# Patient Record
Sex: MALE | URBAN - METROPOLITAN AREA
[De-identification: names, ages, dates, MRNs, and addresses within clinical notes are randomized per-mention and may not be internally consistent; named-entity substitution may affect disease eponyms.]

---

## 2019-02-11 ENCOUNTER — Encounter (OUTPATIENT)
Dept: URBAN - METROPOLITAN AREA CLINIC 75 | Facility: CLINIC | Age: 69
End: 2019-02-11
Payer: COMMERCIAL

## 2019-02-11 PROCEDURE — 92015 DETERMINE REFRACTIVE STATE: CPT | Performed by: OPTOMETRIST

## 2019-02-11 PROCEDURE — 92014 COMPRE OPH EXAM EST PT 1/>: CPT | Performed by: OPTOMETRIST

## 2020-09-30 ENCOUNTER — OFFICE VISIT (OUTPATIENT)
Dept: URBAN - METROPOLITAN AREA CLINIC 75 | Facility: CLINIC | Age: 70
End: 2020-09-30
Payer: COMMERCIAL

## 2020-09-30 DIAGNOSIS — H52.4 PRESBYOPIA: Primary | ICD-10-CM

## 2020-09-30 DIAGNOSIS — H25.813 COMBINED FORMS OF AGE-RELATED CATARACT, BILATERAL: ICD-10-CM

## 2020-09-30 PROCEDURE — 92014 COMPRE OPH EXAM EST PT 1/>: CPT | Performed by: OPTOMETRIST

## 2020-09-30 ASSESSMENT — KERATOMETRY: OD: 44.75

## 2020-09-30 ASSESSMENT — INTRAOCULAR PRESSURE
OD: 13
OS: 15

## 2020-09-30 ASSESSMENT — VISUAL ACUITY
OS: 20/40
OD: 20/30

## 2020-09-30 NOTE — IMPRESSION/PLAN
Impression: Combined forms of age-related cataract, bilateral: H25.813 - discussed cataracts appear to be progressing and causing increasing difficulty w/ va and glare. Plan: Pt to return n/a for DE/OCT to consider cat sx. 
If pt elects to not proceed w/ cat sx then can given glasses rx created today

## 2020-11-12 ENCOUNTER — OFFICE VISIT (OUTPATIENT)
Dept: URBAN - METROPOLITAN AREA CLINIC 75 | Facility: CLINIC | Age: 70
End: 2020-11-12
Payer: MEDICARE

## 2020-11-12 DIAGNOSIS — H43.813 VITREOUS DEGENERATION, BILATERAL: ICD-10-CM

## 2020-11-12 PROCEDURE — 99214 OFFICE O/P EST MOD 30 MIN: CPT | Performed by: OPTOMETRIST

## 2020-11-12 ASSESSMENT — VISUAL ACUITY
OD: 20/30
OS: 20/40

## 2020-11-12 ASSESSMENT — INTRAOCULAR PRESSURE
OD: 14
OS: 14

## 2020-11-12 NOTE — IMPRESSION/PLAN
Impression: Combined forms of age-related cataract, bilateral: H25.813 - symptomatic. Recommend cataract extraction w/ IOL placement OU. Discussed risks, alteratives, and benefits. Plan: Pt elects to proceed w/ cat sx OS then OD.  Schedule pre op w/ surgeon

## 2020-11-12 NOTE — IMPRESSION/PLAN
Impression: Vitreous degeneration, bilateral: H43.813 - OPTOS reveals PVD OU. stable-no holes or tears noted.  Plan: Observe

## 2021-01-07 ENCOUNTER — PRE-OPERATIVE VISIT (OUTPATIENT)
Dept: URBAN - METROPOLITAN AREA CLINIC 71 | Facility: CLINIC | Age: 71
End: 2021-01-07
Payer: MEDICARE

## 2021-01-07 DIAGNOSIS — H52.223 REGULAR ASTIGMATISM, BILATERAL: ICD-10-CM

## 2021-01-07 DIAGNOSIS — H21.81 FLOPPY IRIS SYNDROME: ICD-10-CM

## 2021-01-07 PROCEDURE — 92136 OPHTHALMIC BIOMETRY: CPT | Performed by: OPHTHALMOLOGY

## 2021-01-07 PROCEDURE — 92014 COMPRE OPH EXAM EST PT 1/>: CPT | Performed by: OPHTHALMOLOGY

## 2021-01-07 ASSESSMENT — PACHYMETRY
OD: 23.50
OD: 2.31
OS: 2.40
OS: 23.47

## 2021-01-07 NOTE — IMPRESSION/PLAN
Impression: Combined forms of age-related cataract, bilateral: H25.813. Plan: OU: Discussed diagnosis in detail with patient. Discussed treatment options with patient. Discussed risks and benefits and patient understands. Discussed signs and symptoms of retinal detachment. Discussed signs and symptoms of PVD/floaters. Discussed risks of progression. New medication(s) Rx given today. Surgical treatment is necessary to improve vision. Patient elects to have surgery. Surgical risks and benefits were discussed, explained and understood by patient. Schedule surgery in the OR. Lid scrubs and hygiene were explained. Patient instructed to use artificial tears as needed. Pre op instructions given and understood. Post op instructions given and understood. Educational materials provided: Cataract and Cataract extraction/lens. WILL NEED RING. 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 PROCEED WITH TRADITIONAL CATARACT SURGERY OS FIRST THEN OD FOR DISTANCE WITH RX DROPS VIGAMOX TID X1WK, NEVANAC TID X1 WK, AND DUREZOL TID X2WKS, THEN BID X1 WK, THEN QD X1WK.

## 2021-01-08 RX ORDER — ALLOPURINOL 300 MG/1
300 MG TABLET ORAL
Qty: 0 | Refills: 0 | Status: ACTIVE
Start: 2021-01-08

## 2021-01-08 NOTE — IMPRESSION/PLAN
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 Plan: OU: Discussed diagnosis in detail with patient. No treatment is required at this time. Discussed risks and benefits and patient understands. Advised patient of condition. Reassured patient of current condition and treatment. No change to current treatment.

## 2021-01-18 ENCOUNTER — SURGERY (OUTPATIENT)
Dept: URBAN - METROPOLITAN AREA SURGERY 45 | Facility: SURGERY | Age: 71
End: 2021-01-18
Payer: MEDICARE

## 2021-01-18 ENCOUNTER — SURGERY (OUTPATIENT)
Dept: URBAN - METROPOLITAN AREA SURGERY 44 | Facility: SURGERY | Age: 71
End: 2021-01-18
Payer: MEDICARE

## 2021-01-18 PROCEDURE — 66982 XCAPSL CTRC RMVL CPLX WO ECP: CPT | Performed by: OPHTHALMOLOGY

## 2021-01-18 PROCEDURE — G8907 PT DOC NO EVENTS ON DISCHARG: HCPCS | Performed by: CLINIC/CENTER

## 2021-01-18 PROCEDURE — G8918 PT W/O PREOP ORDER IV AB PRO: HCPCS | Performed by: CLINIC/CENTER

## 2021-01-19 ENCOUNTER — POST-OPERATIVE VISIT (OUTPATIENT)
Dept: URBAN - METROPOLITAN AREA CLINIC 75 | Facility: CLINIC | Age: 71
End: 2021-01-19
Payer: MEDICARE

## 2021-01-19 DIAGNOSIS — Z48.810 ENCOUNTER FOR SURGICAL AFTERCARE FOLLOWING SURGERY ON A SENSE ORGAN: Primary | ICD-10-CM

## 2021-01-19 PROCEDURE — 99024 POSTOP FOLLOW-UP VISIT: CPT | Performed by: OPTOMETRIST

## 2021-01-19 ASSESSMENT — INTRAOCULAR PRESSURE
OS: 13
OD: 15

## 2021-01-19 NOTE — IMPRESSION/PLAN
Impression:  Encounter for surgical aftercare following surgery on a sense organ  Z48.810. Plan: Recommend artifical tears 3-4x daily and take post operative medication as directed. Advised pt that vision will continue to improve as eye heals.

## 2021-02-01 ENCOUNTER — SURGERY (OUTPATIENT)
Dept: URBAN - METROPOLITAN AREA SURGERY 45 | Facility: SURGERY | Age: 71
End: 2021-02-01
Payer: MEDICARE

## 2021-02-01 ENCOUNTER — SURGERY (OUTPATIENT)
Dept: URBAN - METROPOLITAN AREA SURGERY 44 | Facility: SURGERY | Age: 71
End: 2021-02-01
Payer: MEDICARE

## 2021-02-01 PROCEDURE — G8918 PT W/O PREOP ORDER IV AB PRO: HCPCS | Performed by: CLINIC/CENTER

## 2021-02-01 PROCEDURE — 66982 XCAPSL CTRC RMVL CPLX WO ECP: CPT | Performed by: OPHTHALMOLOGY

## 2021-02-01 PROCEDURE — G8907 PT DOC NO EVENTS ON DISCHARG: HCPCS | Performed by: CLINIC/CENTER

## 2021-02-02 ENCOUNTER — POST-OPERATIVE VISIT (OUTPATIENT)
Dept: URBAN - METROPOLITAN AREA CLINIC 75 | Facility: CLINIC | Age: 71
End: 2021-02-02
Payer: MEDICARE

## 2021-02-02 DIAGNOSIS — Z96.1 PRESENCE OF INTRAOCULAR LENS: Primary | ICD-10-CM

## 2021-02-02 PROCEDURE — 99024 POSTOP FOLLOW-UP VISIT: CPT | Performed by: OPTOMETRIST

## 2021-02-02 ASSESSMENT — INTRAOCULAR PRESSURE
OD: 16
OS: 15

## 2021-02-02 NOTE — IMPRESSION/PLAN
Impression: S/P COMPLEX Cataract Extraction by phacoemulsification with IOL placement OD - 1 Day. Presence of intraocular lens  Z96.1- Wound intact OD. Plan: Recommend pt use artificial tears OU 3-4x daily. Importance of taking post-op medication as directed explained.

## 2021-03-03 ENCOUNTER — POST-OPERATIVE VISIT (OUTPATIENT)
Dept: URBAN - METROPOLITAN AREA CLINIC 75 | Facility: CLINIC | Age: 71
End: 2021-03-03
Payer: MEDICARE

## 2021-03-03 PROCEDURE — 99024 POSTOP FOLLOW-UP VISIT: CPT | Performed by: OPTOMETRIST

## 2021-03-03 ASSESSMENT — INTRAOCULAR PRESSURE
OD: 11
OS: 11

## 2021-03-03 ASSESSMENT — VISUAL ACUITY
OD: 20/30
OS: 20/30

## 2021-03-03 NOTE — IMPRESSION/PLAN
Impression:  Presence of intraocular lens  Z96.1. Excellent post op course Plan: A glasses Rx has been generated and dispensed today. Pt to call with any concerns.  6 months for DE

## 2021-10-26 ENCOUNTER — OFFICE VISIT (OUTPATIENT)
Dept: URBAN - METROPOLITAN AREA CLINIC 75 | Facility: CLINIC | Age: 71
End: 2021-10-26
Payer: MEDICARE

## 2021-10-26 DIAGNOSIS — H26.492 OTHER SECONDARY CATARACT, LEFT EYE: ICD-10-CM

## 2021-10-26 DIAGNOSIS — H40.013 OPEN ANGLE WITH BORDERLINE FINDINGS, LOW RISK, BILATERAL: ICD-10-CM

## 2021-10-26 PROCEDURE — 99214 OFFICE O/P EST MOD 30 MIN: CPT | Performed by: OPTOMETRIST

## 2021-10-26 ASSESSMENT — INTRAOCULAR PRESSURE
OS: 10
OD: 9

## 2021-10-26 NOTE — IMPRESSION/PLAN
Impression: Vitreous degeneration, bilateral: H43.813. Plan: Also known as posterior vitreous detachments are a normal aging change due to the vitreous jelly pulling away from the retinal lining of the eye. They may accompany retinal tears, retinal holes, or retinal detachments, so a dilated retinal examination is important. PVDs will usually diminish with time, but it may take several months and they rarely disappear entirely. Optos ordered today. No holes or tears found OU.   Will observe

## 2021-10-26 NOTE — IMPRESSION/PLAN
Impression: Other secondary cataract, left eye: H26.492. Plan: Discussed posterior capsular opacification occurs in about 20% of all cataract surgery cases, months to years later. There is no way to prevent its occurrence. It is due to remaining lens epithelial cells that proliferate and coat the clear posterior capsule. Posterior capsular opacification often needs a YAG laser posterior capsulotomy to remove the opacity and improve the vision.

## 2022-06-09 ENCOUNTER — OFFICE VISIT (OUTPATIENT)
Dept: URBAN - METROPOLITAN AREA CLINIC 75 | Facility: CLINIC | Age: 72
End: 2022-06-09
Payer: MEDICARE

## 2022-06-09 DIAGNOSIS — H43.822 VITREOMACULAR ADHESION, LEFT EYE: ICD-10-CM

## 2022-06-09 DIAGNOSIS — H40.013 OPEN ANGLE WITH BORDERLINE FINDINGS, LOW RISK, BILATERAL: Primary | ICD-10-CM

## 2022-06-09 DIAGNOSIS — H43.811 VITREOUS DEGENERATION, RIGHT EYE: ICD-10-CM

## 2022-06-09 DIAGNOSIS — H35.371 PUCKERING OF MACULA, RIGHT EYE: ICD-10-CM

## 2022-06-09 PROCEDURE — 99214 OFFICE O/P EST MOD 30 MIN: CPT | Performed by: OPTOMETRIST

## 2022-06-09 PROCEDURE — 92133 CPTRZD OPH DX IMG PST SGM ON: CPT | Performed by: OPTOMETRIST

## 2022-06-09 PROCEDURE — 92134 CPTRZ OPH DX IMG PST SGM RTA: CPT | Performed by: OPTOMETRIST

## 2022-06-09 ASSESSMENT — INTRAOCULAR PRESSURE
OD: 12
OS: 12

## 2022-06-09 NOTE — IMPRESSION/PLAN
Impression: Vitreous degeneration, right eye: H43.811. OCT ordered and performed Plan: Also known as posterior vitreous detachments are a normal aging change due to the vitreous jelly pulling away from the retinal lining of the eye. They may accompany retinal tears, retinal holes, or retinal detachments, so a dilated retinal examination is important. PVDs will usually diminish with time, but it may take several months and they rarely disappear entirely. Pt to contact office if symptoms worsen, including an increase in number of floaters, you experience flashing lights, loss of vision, or a black curtain blocking your field of vision.

## 2022-06-09 NOTE — IMPRESSION/PLAN
Impression: Vitreomacular adhesion, left eye: H43.822. OCT ordered and performed Plan: Vitreomacular adhesion (VMA) is a human medical condition where the vitreous gel (or simply vitreous, AKA vitreous humour) of the human eye adheres to the retina in an abnormally strong manner. As the eye ages, it is common for the vitreous to separate from the retina. Monitor. Patient to contact office with any questions or concerns.

## 2022-06-09 NOTE — IMPRESSION/PLAN
Impression: Open angle with borderline findings, low risk, bilateral: H40.013. OCT ordered and performed Plan: Discussed glaucoma suspects do not usually need to be treated but do need close follow up monitoring for early signs of glaucoma damage. If treatment is necessary then glaucoma eye drops and pills are utilized. Glaucoma suspects have some characteristics of glaucoma, but not all, and the findings may be very subtle. No treatment recommended at this time.  

Contact the office if: Any loss of vision, eye pain, cloudy vision, photophobia, or dark spots or clouds blocking portions of your vision occurs

## 2022-06-09 NOTE — IMPRESSION/PLAN
Impression: Puckering of macula, right eye: H35.371. OCT ordered and performed Plan: Discussed epiretinal membranes do not usually require treatment, unless distorted vision or blurry vision occur. The main treatment consists of vitrectomy surgery with peeling off of the epiretinal membrane. Epiretinal membrane formation is often without symptoms or effect on vision. They occur from aging, previous eye trauma or surgery, or chronic eye inflammation (such as uveitis). In rare instances, they can progress and have a significant affect on vision. Pt to contact office w/ any loss or distortion of vision.

## 2022-07-20 ENCOUNTER — OFFICE VISIT (OUTPATIENT)
Dept: URBAN - METROPOLITAN AREA CLINIC 75 | Facility: CLINIC | Age: 72
End: 2022-07-20
Payer: COMMERCIAL

## 2022-07-20 DIAGNOSIS — H02.886 MEIBOMIAN GLAND DYSFUNCTION OF LEFT EYE: ICD-10-CM

## 2022-07-20 DIAGNOSIS — H02.883 MEIBOMIAN GLAND DYSFUNCTION OF RIGHT EYE: ICD-10-CM

## 2022-07-20 DIAGNOSIS — Z96.1 PRESENCE OF INTRAOCULAR LENS: ICD-10-CM

## 2022-07-20 DIAGNOSIS — H52.4 PRESBYOPIA: Primary | ICD-10-CM

## 2022-07-20 PROCEDURE — 92014 COMPRE OPH EXAM EST PT 1/>: CPT | Performed by: OPTOMETRIST

## 2022-07-20 ASSESSMENT — KERATOMETRY
OD: 44.63
OS: 44.38

## 2022-07-20 ASSESSMENT — VISUAL ACUITY
OS: 20/30
OD: 20/20

## 2022-07-20 ASSESSMENT — INTRAOCULAR PRESSURE
OD: 10
OS: 10

## 2022-07-20 NOTE — IMPRESSION/PLAN
Impression: Meibomian gland dysfunction of right eye: H02.883. Plan: Meibomian gland dysfunction (MGD) is a common eye condition, yet many people don't realize they have it. You get it when there's a problem with a few dozen tiny glands in your eyelids that help make the oil layer of your tears.

## 2022-07-20 NOTE — IMPRESSION/PLAN
Impression: Meibomian gland dysfunction of left eye: H02.886. Plan: Meibomian gland dysfunction (MGD) is a common eye condition, yet many people don't realize they have it. You get it when there's a problem with a few dozen tiny glands in your eyelids that help make the oil layer of your tears.

## 2022-11-23 ENCOUNTER — OFFICE VISIT (OUTPATIENT)
Dept: URBAN - METROPOLITAN AREA CLINIC 75 | Facility: CLINIC | Age: 72
End: 2022-11-23

## 2022-11-23 DIAGNOSIS — H52.4 PRESBYOPIA: Primary | ICD-10-CM

## 2022-11-23 PROCEDURE — 92015 DETERMINE REFRACTIVE STATE: CPT | Performed by: OPTOMETRIST

## 2022-11-23 ASSESSMENT — VISUAL ACUITY
OD: 20/20
OS: 20/25

## 2022-11-23 ASSESSMENT — INTRAOCULAR PRESSURE
OD: 7
OS: 12

## 2023-06-26 ENCOUNTER — OFFICE VISIT (OUTPATIENT)
Dept: URBAN - METROPOLITAN AREA CLINIC 75 | Facility: CLINIC | Age: 73
End: 2023-06-26
Payer: MEDICARE

## 2023-06-26 DIAGNOSIS — H43.811 VITREOUS DEGENERATION, RIGHT EYE: Primary | ICD-10-CM

## 2023-06-26 DIAGNOSIS — H26.493 OTHER SECONDARY CATARACT, BILATERAL: ICD-10-CM

## 2023-06-26 DIAGNOSIS — H35.371 PUCKERING OF MACULA, RIGHT EYE: ICD-10-CM

## 2023-06-26 DIAGNOSIS — H43.822 VITREOMACULAR ADHESION, LEFT EYE: ICD-10-CM

## 2023-06-26 PROCEDURE — 99213 OFFICE O/P EST LOW 20 MIN: CPT | Performed by: OPTOMETRIST

## 2023-06-26 PROCEDURE — 92134 CPTRZ OPH DX IMG PST SGM RTA: CPT | Performed by: OPTOMETRIST

## 2023-06-26 ASSESSMENT — INTRAOCULAR PRESSURE
OS: 11
OD: 13

## 2023-06-26 NOTE — IMPRESSION/PLAN
Impression: Vitreous degeneration, right eye: H43.811. OCT ordered and performed Plan: Posterior vitreous detachments (PVD) usually diminish with time, but it may take several months. They rarely disappear entirely. PVD is a normal aging change due to vitreous jelly pulling away from the retinal lining of the eye. They may accompany retinal tears, retinal holes, or retinal detachments, so a dilated retinal exam is important. Contact office if symptoms worsen, including increased floaters, flashing light, loss of vision or a black curtain blocking your field of vision.

## 2023-06-26 NOTE — IMPRESSION/PLAN
Impression: Puckering of macula, right eye: H35.371. OCT ordered and performed revealing - Mild stable Plan: Epiretinal membranes do not usually require treatment, unless distorted vision or blurry vision occur. The main treatment consists of vitrectomy surgery with peeling off the ERM. An ERM can occur from aging, previous eye trauma, or chronic eye inflammation (such as uveitis). Contact office if you experience a loss of vision or distortion.

## 2023-06-26 NOTE — IMPRESSION/PLAN
Impression: Other secondary cataract, bilateral: H26.493. Plan: Posterior capsular opacification (PCO) often needs a YAG laser posterior capsulotomy to remove the opacity and improve vision. PCO occurs in about 20% of all cataract surgery cases, months to years later. There is no way to prevent its occurance. It is due to remaining lens epithelial cells that proliferate and coat the clear posterior capsule. No treatment recommended at this time. Monitor. Patient to contact office with changes in vision or concerns.

## 2023-08-17 ENCOUNTER — OFFICE VISIT (OUTPATIENT)
Dept: URBAN - METROPOLITAN AREA CLINIC 75 | Facility: CLINIC | Age: 73
End: 2023-08-17
Payer: COMMERCIAL

## 2023-08-17 DIAGNOSIS — H52.4 PRESBYOPIA: Primary | ICD-10-CM

## 2023-08-17 DIAGNOSIS — Z96.1 PRESENCE OF INTRAOCULAR LENS: ICD-10-CM

## 2023-08-17 PROCEDURE — 92014 COMPRE OPH EXAM EST PT 1/>: CPT | Performed by: OPTOMETRIST

## 2023-08-17 ASSESSMENT — INTRAOCULAR PRESSURE
OD: 12
OS: 10

## 2023-08-17 ASSESSMENT — VISUAL ACUITY
OS: 20/20
OD: 20/20

## 2024-07-08 ENCOUNTER — OFFICE VISIT (OUTPATIENT)
Dept: URBAN - METROPOLITAN AREA CLINIC 75 | Facility: CLINIC | Age: 74
End: 2024-07-08
Payer: MEDICARE

## 2024-07-08 DIAGNOSIS — H26.493 OTHER SECONDARY CATARACT, BILATERAL: ICD-10-CM

## 2024-07-08 DIAGNOSIS — H35.371 PUCKERING OF MACULA, RIGHT EYE: Primary | ICD-10-CM

## 2024-07-08 PROCEDURE — 99213 OFFICE O/P EST LOW 20 MIN: CPT | Performed by: OPTOMETRIST

## 2024-07-08 PROCEDURE — 92134 CPTRZ OPH DX IMG PST SGM RTA: CPT | Performed by: OPTOMETRIST

## 2024-07-08 ASSESSMENT — INTRAOCULAR PRESSURE
OS: 11
OD: 10

## 2024-08-15 ENCOUNTER — OFFICE VISIT (OUTPATIENT)
Dept: URBAN - METROPOLITAN AREA CLINIC 75 | Facility: CLINIC | Age: 74
End: 2024-08-15
Payer: COMMERCIAL

## 2024-08-15 DIAGNOSIS — H52.4 PRESBYOPIA: Primary | ICD-10-CM

## 2024-08-15 DIAGNOSIS — H04.123 TEAR FILM INSUFFICIENCY OF BILATERAL LACRIMAL GLANDS: ICD-10-CM

## 2024-08-15 PROCEDURE — 92014 COMPRE OPH EXAM EST PT 1/>: CPT | Performed by: OPTOMETRIST

## 2024-08-15 ASSESSMENT — VISUAL ACUITY
OD: 20/20
OS: 20/15

## 2024-08-15 ASSESSMENT — INTRAOCULAR PRESSURE
OS: 14
OD: 13

## 2025-03-18 ENCOUNTER — OFFICE VISIT (OUTPATIENT)
Dept: URBAN - METROPOLITAN AREA CLINIC 75 | Facility: CLINIC | Age: 75
End: 2025-03-18
Payer: MEDICARE

## 2025-03-18 DIAGNOSIS — G43.111 MIGRAINE WITH AURA, INTRACTABLE, WITH STATUS MIGRAINOSUS: Primary | ICD-10-CM

## 2025-03-18 PROCEDURE — 99214 OFFICE O/P EST MOD 30 MIN: CPT | Performed by: OPTOMETRIST

## 2025-03-18 ASSESSMENT — INTRAOCULAR PRESSURE
OD: 13
OS: 12

## 2025-07-21 ENCOUNTER — OFFICE VISIT (OUTPATIENT)
Dept: URBAN - METROPOLITAN AREA CLINIC 75 | Facility: CLINIC | Age: 75
End: 2025-07-21
Payer: MEDICARE

## 2025-07-21 DIAGNOSIS — E11.9 TYPE 2 DIABETES MELLITUS WITHOUT COMPLICATIONS: ICD-10-CM

## 2025-07-21 DIAGNOSIS — H35.371 PUCKERING OF MACULA, RIGHT EYE: Primary | ICD-10-CM

## 2025-07-21 DIAGNOSIS — H43.822 VITREOMACULAR TRACTION OF LEFT EYE: ICD-10-CM

## 2025-07-21 PROCEDURE — 92133 CPTRZD OPH DX IMG PST SGM ON: CPT | Performed by: OPTOMETRIST

## 2025-07-21 PROCEDURE — 99214 OFFICE O/P EST MOD 30 MIN: CPT | Performed by: OPTOMETRIST

## 2025-07-21 PROCEDURE — 92134 CPTRZ OPH DX IMG PST SGM RTA: CPT | Performed by: OPTOMETRIST

## 2025-07-21 ASSESSMENT — INTRAOCULAR PRESSURE
OS: 15
OD: 13

## 2025-08-19 ENCOUNTER — OFFICE VISIT (OUTPATIENT)
Dept: URBAN - METROPOLITAN AREA CLINIC 75 | Facility: CLINIC | Age: 75
End: 2025-08-19
Payer: COMMERCIAL

## 2025-08-19 DIAGNOSIS — H52.4 PRESBYOPIA: Primary | ICD-10-CM

## 2025-08-19 PROCEDURE — 92014 COMPRE OPH EXAM EST PT 1/>: CPT | Performed by: OPTOMETRIST

## 2025-08-19 ASSESSMENT — INTRAOCULAR PRESSURE
OS: 16
OD: 16

## 2025-08-19 ASSESSMENT — VISUAL ACUITY
OD: 20/20
OS: 20/20